# Patient Record
Sex: MALE | Race: ASIAN | NOT HISPANIC OR LATINO | Employment: UNEMPLOYED | URBAN - METROPOLITAN AREA
[De-identification: names, ages, dates, MRNs, and addresses within clinical notes are randomized per-mention and may not be internally consistent; named-entity substitution may affect disease eponyms.]

---

## 2017-09-11 ENCOUNTER — OFFICE VISIT - HEALTHEAST (OUTPATIENT)
Dept: FAMILY MEDICINE | Facility: CLINIC | Age: 27
End: 2017-09-11

## 2017-09-11 DIAGNOSIS — R42 VERTIGO: ICD-10-CM

## 2017-09-11 ASSESSMENT — MIFFLIN-ST. JEOR: SCORE: 2275.09

## 2020-01-24 ENCOUNTER — OFFICE VISIT - HEALTHEAST (OUTPATIENT)
Dept: FAMILY MEDICINE | Facility: CLINIC | Age: 30
End: 2020-01-24

## 2020-01-24 DIAGNOSIS — E66.01 CLASS 3 SEVERE OBESITY DUE TO EXCESS CALORIES WITH BODY MASS INDEX (BMI) OF 40.0 TO 44.9 IN ADULT, UNSPECIFIED WHETHER SERIOUS COMORBIDITY PRESENT (H): ICD-10-CM

## 2020-01-24 DIAGNOSIS — Z71.89 ADVANCED CARE PLANNING/COUNSELING DISCUSSION: ICD-10-CM

## 2020-01-24 DIAGNOSIS — Z11.3 SCREEN FOR STD (SEXUALLY TRANSMITTED DISEASE): ICD-10-CM

## 2020-01-24 DIAGNOSIS — N48.1 BALANITIS: ICD-10-CM

## 2020-01-24 DIAGNOSIS — E66.01 MORBID OBESITY (H): ICD-10-CM

## 2020-01-24 DIAGNOSIS — Z00.00 ROUTINE GENERAL MEDICAL EXAMINATION AT A HEALTH CARE FACILITY: ICD-10-CM

## 2020-01-24 DIAGNOSIS — R76.8 ABNORMAL HEPATITIS SEROLOGY: ICD-10-CM

## 2020-01-24 DIAGNOSIS — E66.813 CLASS 3 SEVERE OBESITY DUE TO EXCESS CALORIES WITH BODY MASS INDEX (BMI) OF 40.0 TO 44.9 IN ADULT, UNSPECIFIED WHETHER SERIOUS COMORBIDITY PRESENT (H): ICD-10-CM

## 2020-01-24 LAB
ALBUMIN SERPL-MCNC: 4 G/DL (ref 3.5–5)
ALBUMIN UR-MCNC: NEGATIVE MG/DL
ALP SERPL-CCNC: 113 U/L (ref 45–120)
ALT SERPL W P-5'-P-CCNC: 83 U/L (ref 0–45)
ANION GAP SERPL CALCULATED.3IONS-SCNC: 11 MMOL/L (ref 5–18)
APPEARANCE UR: CLEAR
AST SERPL W P-5'-P-CCNC: 35 U/L (ref 0–40)
BILIRUB SERPL-MCNC: 0.8 MG/DL (ref 0–1)
BILIRUB UR QL STRIP: NEGATIVE
BUN SERPL-MCNC: 14 MG/DL (ref 8–22)
CALCIUM SERPL-MCNC: 9.3 MG/DL (ref 8.5–10.5)
CHLORIDE BLD-SCNC: 106 MMOL/L (ref 98–107)
CO2 SERPL-SCNC: 25 MMOL/L (ref 22–31)
COLOR UR AUTO: YELLOW
CREAT SERPL-MCNC: 0.94 MG/DL (ref 0.7–1.3)
GFR SERPL CREATININE-BSD FRML MDRD: >60 ML/MIN/1.73M2
GLUCOSE BLD-MCNC: 136 MG/DL (ref 70–125)
GLUCOSE UR STRIP-MCNC: NEGATIVE MG/DL
HGB UR QL STRIP: NEGATIVE
HIV 1+2 AB+HIV1 P24 AG SERPL QL IA: NEGATIVE
KETONES UR STRIP-MCNC: NEGATIVE MG/DL
LEUKOCYTE ESTERASE UR QL STRIP: NEGATIVE
NITRATE UR QL: NEGATIVE
PH UR STRIP: 5.5 [PH] (ref 5–8)
POTASSIUM BLD-SCNC: 3.9 MMOL/L (ref 3.5–5)
PROT SERPL-MCNC: 6.9 G/DL (ref 6–8)
SODIUM SERPL-SCNC: 142 MMOL/L (ref 136–145)
SP GR UR STRIP: 1.02 (ref 1–1.03)
TSH SERPL DL<=0.005 MIU/L-ACNC: 1.77 UIU/ML (ref 0.3–5)
UROBILINOGEN UR STRIP-ACNC: NORMAL

## 2020-01-24 ASSESSMENT — MIFFLIN-ST. JEOR: SCORE: 2247.58

## 2020-01-25 LAB
HBV SURFACE AG SERPL QL IA: NEGATIVE
T PALLIDUM AB SER QL: NEGATIVE

## 2020-01-27 ENCOUNTER — COMMUNICATION - HEALTHEAST (OUTPATIENT)
Dept: FAMILY MEDICINE | Facility: CLINIC | Age: 30
End: 2020-01-27

## 2020-01-27 LAB
C TRACH DNA SPEC QL PROBE+SIG AMP: NEGATIVE
HEPATITIS B SURFACE ANTIBODY LHE- HISTORICAL: NEGATIVE
N GONORRHOEA DNA SPEC QL NAA+PROBE: NEGATIVE

## 2020-02-03 ENCOUNTER — COMMUNICATION - HEALTHEAST (OUTPATIENT)
Dept: FAMILY MEDICINE | Facility: CLINIC | Age: 30
End: 2020-02-03

## 2020-10-26 ENCOUNTER — COMMUNICATION - HEALTHEAST (OUTPATIENT)
Dept: SCHEDULING | Facility: CLINIC | Age: 30
End: 2020-10-26

## 2020-10-30 ENCOUNTER — COMMUNICATION - HEALTHEAST (OUTPATIENT)
Dept: FAMILY MEDICINE | Facility: CLINIC | Age: 30
End: 2020-10-30

## 2020-10-30 ENCOUNTER — OFFICE VISIT - HEALTHEAST (OUTPATIENT)
Dept: FAMILY MEDICINE | Facility: CLINIC | Age: 30
End: 2020-10-30

## 2020-10-30 DIAGNOSIS — R12 HEARTBURN: ICD-10-CM

## 2020-10-30 DIAGNOSIS — E66.01 MORBID OBESITY (H): ICD-10-CM

## 2020-10-30 DIAGNOSIS — K21.9 GASTROESOPHAGEAL REFLUX DISEASE WITHOUT ESOPHAGITIS: ICD-10-CM

## 2020-11-03 ENCOUNTER — AMBULATORY - HEALTHEAST (OUTPATIENT)
Dept: LAB | Facility: CLINIC | Age: 30
End: 2020-11-03

## 2020-11-03 DIAGNOSIS — K21.9 GASTROESOPHAGEAL REFLUX DISEASE WITHOUT ESOPHAGITIS: ICD-10-CM

## 2020-11-03 DIAGNOSIS — R12 HEARTBURN: ICD-10-CM

## 2020-11-04 LAB — H PYLORI AG STL QL IA: POSITIVE

## 2020-11-08 ENCOUNTER — AMBULATORY - HEALTHEAST (OUTPATIENT)
Dept: FAMILY MEDICINE | Facility: CLINIC | Age: 30
End: 2020-11-08

## 2020-11-08 DIAGNOSIS — A04.8 H. PYLORI INFECTION: ICD-10-CM

## 2020-12-08 ENCOUNTER — COMMUNICATION - HEALTHEAST (OUTPATIENT)
Dept: FAMILY MEDICINE | Facility: CLINIC | Age: 30
End: 2020-12-08

## 2021-04-19 ENCOUNTER — COMMUNICATION - HEALTHEAST (OUTPATIENT)
Dept: FAMILY MEDICINE | Facility: CLINIC | Age: 31
End: 2021-04-19

## 2021-04-22 ENCOUNTER — RECORDS - HEALTHEAST (OUTPATIENT)
Dept: GENERAL RADIOLOGY | Facility: CLINIC | Age: 31
End: 2021-04-22

## 2021-04-22 ENCOUNTER — OFFICE VISIT - HEALTHEAST (OUTPATIENT)
Dept: FAMILY MEDICINE | Facility: CLINIC | Age: 31
End: 2021-04-22

## 2021-04-22 DIAGNOSIS — M54.50 CHRONIC BILATERAL LOW BACK PAIN WITHOUT SCIATICA: ICD-10-CM

## 2021-04-22 DIAGNOSIS — G89.29 OTHER CHRONIC PAIN: ICD-10-CM

## 2021-04-22 DIAGNOSIS — M54.50 ACUTE MIDLINE LOW BACK PAIN WITHOUT SCIATICA: ICD-10-CM

## 2021-04-22 DIAGNOSIS — G89.29 CHRONIC BILATERAL LOW BACK PAIN WITHOUT SCIATICA: ICD-10-CM

## 2021-04-22 DIAGNOSIS — M54.50 LOW BACK PAIN: ICD-10-CM

## 2021-04-22 RX ORDER — CYCLOBENZAPRINE HCL 10 MG
10 TABLET ORAL 2 TIMES DAILY PRN
Qty: 20 TABLET | Refills: 0 | Status: SHIPPED | OUTPATIENT
Start: 2021-04-22 | End: 2022-06-06

## 2021-04-23 ENCOUNTER — AMBULATORY - HEALTHEAST (OUTPATIENT)
Dept: NURSING | Facility: CLINIC | Age: 31
End: 2021-04-23

## 2021-04-25 ENCOUNTER — COMMUNICATION - HEALTHEAST (OUTPATIENT)
Dept: FAMILY MEDICINE | Facility: CLINIC | Age: 31
End: 2021-04-25

## 2021-04-25 DIAGNOSIS — N48.89 FORESKIN FISSURE: ICD-10-CM

## 2021-04-25 DIAGNOSIS — M54.50 ACUTE MIDLINE LOW BACK PAIN WITHOUT SCIATICA: ICD-10-CM

## 2021-05-12 ENCOUNTER — COMMUNICATION - HEALTHEAST (OUTPATIENT)
Dept: FAMILY MEDICINE | Facility: CLINIC | Age: 31
End: 2021-05-12

## 2021-05-12 DIAGNOSIS — G89.29 CHRONIC BILATERAL LOW BACK PAIN WITHOUT SCIATICA: ICD-10-CM

## 2021-05-12 DIAGNOSIS — M54.50 ACUTE MIDLINE LOW BACK PAIN WITHOUT SCIATICA: ICD-10-CM

## 2021-05-12 DIAGNOSIS — M54.50 CHRONIC BILATERAL LOW BACK PAIN WITHOUT SCIATICA: ICD-10-CM

## 2021-05-14 ENCOUNTER — COMMUNICATION - HEALTHEAST (OUTPATIENT)
Dept: PHYSICAL MEDICINE AND REHAB | Facility: CLINIC | Age: 31
End: 2021-05-14

## 2021-05-14 ENCOUNTER — AMBULATORY - HEALTHEAST (OUTPATIENT)
Dept: NURSING | Facility: CLINIC | Age: 31
End: 2021-05-14

## 2021-05-27 RX ORDER — CLOTRIMAZOLE 1 %
CREAM (GRAM) TOPICAL
Qty: 30 G | Refills: 0 | Status: SHIPPED | OUTPATIENT
Start: 2021-05-27 | End: 2022-06-06

## 2021-05-31 VITALS — BODY MASS INDEX: 40.46 KG/M2 | HEIGHT: 71 IN | WEIGHT: 289 LBS

## 2021-06-04 VITALS
TEMPERATURE: 98 F | OXYGEN SATURATION: 97 % | HEIGHT: 70 IN | RESPIRATION RATE: 20 BRPM | WEIGHT: 285.56 LBS | HEART RATE: 78 BPM | BODY MASS INDEX: 40.88 KG/M2 | DIASTOLIC BLOOD PRESSURE: 80 MMHG | SYSTOLIC BLOOD PRESSURE: 118 MMHG

## 2021-06-04 VITALS
RESPIRATION RATE: 20 BRPM | DIASTOLIC BLOOD PRESSURE: 86 MMHG | WEIGHT: 284 LBS | HEART RATE: 73 BPM | SYSTOLIC BLOOD PRESSURE: 123 MMHG | BODY MASS INDEX: 41.04 KG/M2

## 2021-06-05 VITALS
RESPIRATION RATE: 14 BRPM | WEIGHT: 282.04 LBS | TEMPERATURE: 97.4 F | HEART RATE: 86 BPM | DIASTOLIC BLOOD PRESSURE: 87 MMHG | BODY MASS INDEX: 40.76 KG/M2 | SYSTOLIC BLOOD PRESSURE: 137 MMHG

## 2021-06-05 NOTE — TELEPHONE ENCOUNTER
Patient Returning Call  Reason for call:  Calling to check status of lab Request  Information relayed to patient:  Relayed Lab Note and Patient agrees. No further questions at this time.   Patient has additional questions:  No  If YES, what are your questions/concerns:  NA   Okay to leave a detailed message?: No call back needed

## 2021-06-05 NOTE — PROGRESS NOTES
Assessment:      Healthy male exam.    1. Routine general medical examination at a health care facility     2. Class 3 severe obesity due to excess calories with body mass index (BMI) of 40.0 to 44.9 in adult, unspecified whether serious comorbidity present (H)     3. Morbid obesity (H)  Thyroid Stimulating Hormone (TSH)   4. Advanced care planning/counseling discussion     5. Abnormal hepatitis serology  Comprehensive Metabolic Panel    Hepatitis B Surface Antibody (Anti-HBs)    Hepatitis B Surface Antigen (HBsAG)   6. Screen for STD (sexually transmitted disease)  Urinalysis    Chlamydia trachomatis & Neisseria gonorrhoeae, Amplified Detection    HIV Antigen/Antibody Screening [place]    Syphilis Screen, [facility]   7. Balanitis  nystatin (MYCOSTATIN) cream     Plan:      This is a 28 yo male here for physical exam:  1.  Health Maintenance -   2.  Obesity - BMI > 40; discussed, check TSH  The following high BMI interventions were performed this visit: encouragement to exercise and lifestyle education regarding diet   3.  Advanced care planning/ discussed today   4.  Abnormal hepatitis serology - will check  Labs today  5.  Screen for STD - check labs  6.  Balanitis - has white discharge at crease of glans - add Nystatin cream        Subjective:      Dk Harris is a 29 y.o. male who presents for an annual exam. The patient reports that there is not domestic violence in his life.     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    No exam data present     Current Outpatient Medications   Medication Sig Dispense Refill     nystatin (MYCOSTATIN) cream Apply topically sparingly two times a day to affected area only until symptoms improve 45 g 1     No current facility-administered medications for this visit.      History reviewed. No pertinent past medical history.  History reviewed. No pertinent surgical history.  Patient has no known allergies.  Family History   Problem Relation Age of Onset     Hypertension Mother      No Medical Problems Father      Social History     Socioeconomic History     Marital status: Single     Spouse name: Not on file     Number of children: Not on file     Years of education: Not on file     Highest education level: Not on file   Occupational History     Not on file   Social Needs     Financial resource strain: Not on file     Food insecurity:     Worry: Not on file     Inability: Not on file     Transportation needs:     Medical: Not on file     Non-medical: Not on file   Tobacco Use     Smoking status: Former Smoker     Last attempt to quit: 2017     Years since quittin.7     Smokeless tobacco: Never Used   Substance and Sexual Activity     Alcohol use: Not on file     Drug use: Not on file     Sexual activity: Not on file   Lifestyle     Physical activity:     Days per week: Not on file     Minutes per session: Not on file     Stress: Not on file   Relationships     Social connections:     Talks on phone: Not on file     Gets together:  "Not on file     Attends [employer] service: Not on file     Active member of club or organization: Not on file     Attends meetings of clubs or organizations: Not on file     Relationship status: Not on file     Intimate partner violence:     Fear of current or ex partner: Not on file     Emotionally abused: Not on file     Physically abused: Not on file     Forced sexual activity: Not on file   Other Topics Concern     Not on file   Social History Narrative     Not on file       Review of Systems  Review of Systems      Pertinent positives as noted in HPI; otherwise 12 point ROS negative.      Objective:     Vitals:    01/24/20 0854   BP: 118/80   Pulse: 78   Resp: 20   Temp: 98  F (36.7  C)   TempSrc: Oral   SpO2: 97%   Weight: (!) 285 lb 9 oz (129.5 kg)   Height: 5' 9.75\" (1.772 m)     Body mass index is 41.27 kg/m .    Physical  Physical Exam    EXAM:  /80 (Patient Site: Left Arm, Patient Position: Sitting, Cuff Size: Adult Large)   Pulse 78   Temp 98  F (36.7  C) (Oral)   Resp 20   Ht 5' 9.75\" (1.772 m)   Wt (!) 285 lb 9 oz (129.5 kg)   SpO2 97%   BMI 41.27 kg/m     Gen:  NAD, appears well, well-hydrated  HEENT:  TMs nl, oropharynx benign, nasal mucosa nl, conjunctiva clear  Neck:  Supple, no adenopathy, no thyromegaly, no carotid bruits, no JVD  Lungs:  Clear to auscultation bilaterally  Cor:  RRR no murmur  Abd:  Soft, nontender, BS+, no masses, no guarding or rebound, no HSM  :  Nl male genitalia except white debris in creases of foreskin, no hernias  Extr:  Neg.  Neuro:  No asymmetry, Nl motor tone/strength, nl sensation, reflexes =, gait nl, nl coordination, CN intact,   Skin:  Warm/dry            "

## 2021-06-09 ENCOUNTER — COMMUNICATION - HEALTHEAST (OUTPATIENT)
Dept: FAMILY MEDICINE | Facility: CLINIC | Age: 31
End: 2021-06-09

## 2021-06-09 DIAGNOSIS — M54.50 CHRONIC BILATERAL LOW BACK PAIN WITHOUT SCIATICA: ICD-10-CM

## 2021-06-09 DIAGNOSIS — M54.50 ACUTE MIDLINE LOW BACK PAIN WITHOUT SCIATICA: ICD-10-CM

## 2021-06-09 DIAGNOSIS — G89.29 CHRONIC BILATERAL LOW BACK PAIN WITHOUT SCIATICA: ICD-10-CM

## 2021-06-10 RX ORDER — NAPROXEN 500 MG/1
TABLET ORAL
Qty: 45 TABLET | Refills: 0 | Status: SHIPPED | OUTPATIENT
Start: 2021-06-10 | End: 2022-06-06

## 2021-06-12 NOTE — TELEPHONE ENCOUNTER
RN Triage:  No PCP    Spoke with 30 yr old Dk who c/o:    Burning sensation in upper stomach which radiates after a while to the chest.  Pain comes and goes.  Can be unbearable, especially the past 2 weeks.    Symptoms first began 3-4 months ago.    Pain can happen day or night.    Rates pain, when present, an 8-9 on a 0-10 pain scale.      Also, about one hour after eating, sometimes burps with acid reflux.    Tries drinking water, or eating a banana.  This seems to help decrease the pain.    Denies current pain.     One month ago had one episode of  dark, tarry, stool.    Previous episode about 3-4 months ago.    Hasn't had BM for 2 days.    PLAN:  Advised OV within the next 2 weeks per protocol.  Transferred pt to [facility] to schedule OV.  Pt scheduled for 10/30/20 at 11:40 with Ermelinda GRANT  Advised to avoid NSAIDS and ASA.  Advised to stop smoking.  Advised to eat smaller meals and avoid snack for 2 hours before sleeping.  Advised to avoid fatty/greasy food, spicy food, caffeine, mint and chocolate.  Advised to call back if abdominal pain is constant for more than 2 hours or become worse.    Irina Jackman RN   [facility] RN Triage          Reason for Disposition    Intermittent burning pains radiating into chest or sour taste in mouth    Additional Information    Negative: Passed out (i.e., fainted, collapsed and was not responding)    Negative: Shock suspected (e.g., cold/pale/clammy skin, too weak to stand, low BP, rapid pulse)    Negative: Visible sweat on face or sweat is dripping down    Negative: Chest pain    Negative: SEVERE abdominal pain (e.g., excruciating)    Negative: Pain lasting > 10 minutes and over 50 years old    Negative: Pain lasting > 10 minutes and over 40 years old and associated chest, arm, neck, upper back, or jaw pain    Negative: Pain lasting > 10 minutes and over 35 years old and at least one cardiac risk factor    Negative: Pain lasting > 10 minutes and history of heart  disease (i.e., heart attack, bypass surgery, angina, angioplasty, CHF)    Negative: Recent injury to the abdomen    Negative: Vomiting red blood or black (coffee ground) material    Negative: Bloody, black, or tarry bowel movements     Not currently.  2 episodes 1-4 months ago.    Negative: Pregnant > 24 weeks and hand or face swelling    Negative: Constant abdominal pain lasting > 2 hours    Negative: Vomiting bile (green color)    Negative: Patient sounds very sick or weak to the triager    Negative: Vomiting and abdomen looks much more swollen than usual    Negative: White of the eyes have turned yellow (i.e.,  jaundice)    Negative: Fever > 103 F (39.4 C)    Negative: Fever > 101 F (38.3 C) and over 60 years of age    Negative: Fever > 100.0 F (37.8 C) and has diabetes mellitus or a weak immune system (e.g., HIV positive, cancer chemotherapy, organ transplant, splenectomy, chronic steroids)    Negative: Fever > 100.0 F (37.8 C) and bedridden (e.g., nursing home patient, stroke, chronic illness, recovering from surgery)    Negative: Age > 60 years    Negative: Patient wants to be seen    Negative: MILD pain that comes and goes (cramps) lasts > 24 hours    Negative: Alcohol abuse known or suspected    Negative: Abdominal pain is a chronic symptom (recurrent or ongoing AND lasting > 4 weeks)    Protocols used: ABDOMINAL PAIN - UPPER-A-OH

## 2021-06-12 NOTE — TELEPHONE ENCOUNTER
Who is calling:  Patient  Reason for Call:    Patient requesting a Covering Provider review if appropriate.  Patient is questioning if he needs a prescription for his acid reflux.  Please reach out to patient and advise.  Thank you.  Date of last appointment with primary care: 10/30/2020  Okay to leave a detailed message: Yes

## 2021-06-12 NOTE — TELEPHONE ENCOUNTER
Question following Office Visit  When did you see your provider: today  What is your question: Patient asking if the doctor is going to prescribe a medication for his acid reflux? Please advise and call patient.  Okay to leave a detailed message: Yes

## 2021-06-12 NOTE — TELEPHONE ENCOUNTER
Patient calling back to ask again which antacids had been recommended.  RN advised  Maalox or Mylanta, per guidelines.    Celina Cyr RN  [facility]

## 2021-06-12 NOTE — PROGRESS NOTES
Subjective:    Dk Harris is a 30 y.o. male who presents with chief complaint of abdominal pain and heartburn.    For the past 6 months has been experiencing upper abdominal pain and heartburn. In past 2 weeks, burning sensation from upper abdominal area to chest has increased. Has never experienced heartburn but assumes it is this. Burps and feels acid come back up. Pain with palpation to upper abdomen when this is occuring. On and off with random occurrence but notices mostly 1 hour after eating. Not as bad in the morning. Has been taking Mylanta two times a day after meals which helps subside pain but comes back later in the day as a lesser pain. Researched his symptoms and worried that he may have stomach ulcers.     No major changes to diet. Triage nurse told him to avoid oily and spicy foods but he has had a hard time staying away from these foods. Non-smoker. Hasn't drank alcohol for a couple months. Hasn't noticed a weight gain or weight loss. Denies fevers. Denies vomiting. No difficulties swallowing. Denies chest pain or shortness of breath. Feeling constipated. Typically stools every day, but now going every other day. Stool is not hard. No bright red blood in the stool. Had a dark, tarry stool 4 months ago and again 2 months ago. Most recent episode lasted a week. None since then and stools are brown. No family history of GI issues.     Patient Active Problem List   Diagnosis     Obesity     Phimosis     Morbid obesity (H)     Advanced care planning/counseling discussion       Current Outpatient Medications:      cyclobenzaprine (FLEXERIL) 10 MG tablet, Take 1 tablet (10 mg total) by mouth 2 (two) times a day as needed for muscle spasms., Disp: 20 tablet, Rfl: 0     naproxen (NAPROSYN) 500 MG tablet, Take 1 tablet (500 mg total) by mouth 2 (two) times a day with meals., Disp: 30 tablet, Rfl: 0     nystatin (MYCOSTATIN) cream, Apply topically sparingly two times a day to affected area only until  symptoms improve, Disp: 45 g, Rfl: 1     Objective:   Allergies:  Patient has no known allergies.    Vitals:  Vitals:    10/30/20 1126   BP: 123/86   Patient Site: Right Arm   Patient Position: Sitting   Cuff Size: Adult Large   Pulse: 73   Resp: 20   Weight: (!) 284 lb (128.8 kg)     Body mass index is 41.04 kg/m .    Vital signs reviewed.  General: Patient is alert and oriented x 3, in no apparent distress  Cardiac: regular rate and rhythm, no murmurs  Pulmonary: lungs clear to auscultation bilaterally, no crackles, rales, rhonchi, or wheezing noted  GI: Abdomen soft, no masses. No pain with palpation. Bowel sounds slightly hypoactive. No organomegaly.     Results for orders placed or performed in visit on 01/24/20   Chlamydia trachomatis & Neisseria gonorrhoeae, Amplified Detection    Specimen: Body Fluid   Result Value Ref Range    Chlamydia trachomatis, Amplified Detection Negative Negative    Neisseria gonorrhoeae, Amplified Detection Negative Negative   Urinalysis   Result Value Ref Range    Color, UA Yellow Colorless, Yellow, Straw, Light Yellow    Clarity, UA Clear Clear    Glucose, UA Negative Negative    Bilirubin, UA Negative Negative    Ketones, UA Negative Negative    Specific Gravity, UA 1.025 1.005 - 1.030    Blood, UA Negative Negative    pH, UA 5.5 5.0 - 8.0    Protein, UA Negative Negative mg/dL    Urobilinogen, UA 1.0 E.U./dL 0.2 E.U./dL, 1.0 E.U./dL    Nitrite, UA Negative Negative    Leukocytes, UA Negative Negative   HIV Antigen/Antibody Screening [facility]   Result Value Ref Range    HIV Antigen / Antibody Negative Negative   Syphilis Screen, [facility]   Result Value Ref Range    Treponema Antibody (Syphilis) Negative Negative   Comprehensive Metabolic Panel   Result Value Ref Range    Sodium 142 136 - 145 mmol/L    Potassium 3.9 3.5 - 5.0 mmol/L    Chloride 106 98 - 107 mmol/L    CO2 25 22 - 31 mmol/L    Anion Gap, Calculation 11 5 - 18 mmol/L    Glucose 136 (H) 70 - 125 mg/dL    BUN 14 8 - 22  mg/dL    Creatinine 0.94 0.70 - 1.30 mg/dL    GFR MDRD Af Amer >60 >60 mL/min/1.73m2    GFR MDRD Non Af Amer >60 >60 mL/min/1.73m2    Bilirubin, Total 0.8 0.0 - 1.0 mg/dL    Calcium 9.3 8.5 - 10.5 mg/dL    Protein, Total 6.9 6.0 - 8.0 g/dL    Albumin 4.0 3.5 - 5.0 g/dL    Alkaline Phosphatase 113 45 - 120 U/L    AST 35 0 - 40 U/L    ALT 83 (H) 0 - 45 U/L   Hepatitis B Surface Antibody (Anti-HBs)   Result Value Ref Range    Hep B Surface Antibody Negative Negative   Hepatitis B Surface Antigen (HBsAG)   Result Value Ref Range    Hepatitis B Surface Ag Negative Negative   Thyroid Stimulating Hormone (TSH)   Result Value Ref Range    TSH 1.77 0.30 - 5.00 uIU/mL       Assessment and Plan:   Dk was seen today for heartburn and flu vaccine.    Diagnoses and all orders for this visit:    1. Gastroesophageal reflux disease without esophagitis  Heartburn and acid reflux symptoms consistent with GERD. Risk factors include obesity and eating spicy foods. Physical exam unremarkable. Start pepcid 20 mg two times a day. Can continue OTC dose of Mylanta. Test stool for H. Pylori. Reviewed GERD education including avoidance of spicy foods, avoidance of alcohol, and keeping HOB elevated, especially after eating. Follow up in 4 weeks to evaluate effectiveness. If no improvement, next steps would include lab workup to assess gallbladder and liver. Black, tarry stools seem to have resolved but would also consider upper endoscopy if melena resumes.  -famotidine (PEPCID) 20 MG tablet  -H. Pylori Antigen, Stool (HPSAG)    2. Heartburn  See above.  -famotidine (PEPCID) 20 MG tablet  -H. Pylori Antigen, Stool (HPSAG)    3. Morbid obesity (H)  BMI 41.04. Weight appears stable. Reviewed healthy diet and increasing physical activity. Limiting alcohol and smoking, stress reduction. Educated that weight loss may improve GERD symptoms as well.  Wt Readings from Last 3 Encounters:   10/30/20 (!) 284 lb (128.8 kg)   02/17/20 (!) 280 lb (127  kg)   01/24/20 (!) 285 lb 9 oz (129.5 kg)     4. Other orders  -Influenza vaccine      Sahra Daniels  DNP-FNP Student  [facility]    I was present for history, pertinent exam findings, and assessment/plan discussion and treatment plan.  ESCOBAR SimmonsC

## 2021-06-12 NOTE — PROGRESS NOTES
Chief Complaint:  Chief Complaint   Patient presents with     Dizziness     [place], he noticed lots of dizziness when he came back to [place]. He has been taking advil to release the dizziness but the medication didn't help.     HPI:   Dk Harris is a 27 y.o. male c/o dizziness.  He says he feels like he is dizzy and lightheaded.  This been going on for about 2-3 weeks.  It was initially quite bad, it has improved.  Today he feels like it might be almost gone.  He denies any presyncopal type symptoms.  He says occasionally he felt a little like that, but only if he stands up too quickly.  His main symptom is that he says the room spins.  He notes that he was in [place] when symptoms started.  He was swimming a lot in a creek and often got water in his ears.  He tried to see a provider in [place], but was unable to do so in a timely fashion.  He denies any falls or head injuries prior to symptoms starting.  He is taking Advil on occasion, but that has not been very helpful.  He has never had this happen before.  No past history of ear issues.  He says it was hot in [place], but he does not think he was dehydrated.  He was staying in an air conditioned location.  He just wants to make sure this is nothing serious.    Mother has hypertension, father is healthy.  No other significant family health issues.  He is otherwise healthy.  No current health issues, no current medications.    No current outpatient prescriptions on file.    Physical Exam:  Vitals:    09/11/17 1142   BP: 130/88   Pulse:    Resp:    Temp:      Body mass index is 40.88 kg/(m^2).    Vital signs stable as recorded above  General: Patient is alert and oriented x 3, in no apparent distress  Eyes: PERRL, EOMI bilaterally, no nystagmus  Ears: TMs non-erythematous with good light reflex bilaterally  Throat: no erythema, edema, or exudate noted  Lymphatic: No cervical lymph node enlargement  Cardiac: Regular rate and  rhythm; no murmurs  Pulmonary: Lung clear to auscultation bilaterally; no crackles, rales, rhonchi, or wheezing noted  Musculoskeletal: Normal 4/5 strength in major muscle groups in upper and lower extremities bilaterally, normal  strength  Neuro: Cranial Nerves 2 through 12 grossly intact, negative Romberg Test, patient walks in a normal tandem gait, negative Hallpike maneuver bilaterally    Assessment/Plan:  Vertigo.  Symptoms are improving.  It is possible this could be related to his ear issues when he was swimming late last month.  Exam is completely normal today.  No red flags on exam or history.  I provided him information on Epley's maneuvers and BPPV.  We discussed the possibility of meclizine, but since his symptoms are improving he declines this.  He will notify us if symptoms stop getting better, or if they worsen.  At that time consider treatment versus further follow-up in clinic or with specialist.    This dictation uses voice recognition software, which may contain typographical errors.

## 2021-06-16 PROBLEM — Z71.89 ADVANCED CARE PLANNING/COUNSELING DISCUSSION: Status: ACTIVE | Noted: 2020-01-24

## 2021-06-16 PROBLEM — E66.01 MORBID OBESITY (H): Status: ACTIVE | Noted: 2020-01-24

## 2021-06-16 PROBLEM — A04.8 H. PYLORI INFECTION: Status: ACTIVE | Noted: 2020-11-08

## 2021-06-16 NOTE — PROGRESS NOTES
Subjective:      Dk Harris is a 30 y.o. male with chief complaint of low back pain.  He has had low back pain for 4 weeks.  Staying the same, not improving.  He does not recall any falls or injuries.  He says he just woke up 1 day with the pain.  Pain is in the low back kind of in the middle.  Does not radiate into the legs.  No loss of control of bladder or bowel function.  No significant pain with walking.  He does have pain with bending at the waist.  Less pain with rest.  He does note that sometimes if he is sitting and turns in a certain way he gets a sharp shooting pain in his back.  Pain can come and go, depending on what he is doing.  In the past he has had occasional mild low back pain, but this is definitely worse than he has ever had before.  He notes that twisting, bending, and getting up from a seated position seem to make things worse.  He has been doing some back stretches and they have not been helpful.  He has not tried any oral medications yet.    Patient Active Problem List   Diagnosis     Obesity     Phimosis     Morbid obesity (H)     Advanced care planning/counseling discussion     H. pylori infection       Current Outpatient Medications:      cyclobenzaprine (FLEXERIL) 10 MG tablet, Take 1 tablet (10 mg total) by mouth 2 (two) times a day as needed for muscle spasms., Disp: 20 tablet, Rfl: 0     naproxen (NAPROSYN) 500 MG tablet, Take 1 tablet (500 mg total) by mouth 2 (two) times a day with meals., Disp: 45 tablet, Rfl: 0      Tobacco Use      Smoking status: Former Smoker        Quit date: 5/1/2017        Years since quitting: 3.9      Smokeless tobacco: Never Used       Objective:     No Known Allergies  Vitals:    04/22/21 1304   BP: 137/87   Pulse: 86   Resp: 14   Temp: 97.4  F (36.3  C)     Body mass index is 40.76 kg/m .    Vitals reviewed as above.  General: Patient is alert and oriented x 3, in no apparent distress  Cardiac: Regular rate and rhythm, no murmurs  Pulmonary: lungs  clear to ausculation, no crackles, rales, rhonchi, or wheezing  Musculoskeletal: normal 4/5 strength in major muscle groups in upper and lower extremities bilaterally, normal foot strength bilaterally, negative straight leg raise test bilaterally, he can flex to 90 degrees at the waist without pain, points to lumbar sacral paraspinous muscles and right buttocks area is main areas of pain, walks a normal tandem gait    Xr Lumbar Spine 2 Or 3 Vws Result Date: 4/23/2021  EXAM: XR LUMBAR SPINE 2 OR 3 VWS LOCATION: [facility] DATE/TIME: 4/22/2021 1:48 PM INDICATION: Low back pain COMPARISON: None. TECHNIQUE: CR Lumbar Spine.   5 lumbar type vertebrae. Subtle levocurvature of the lumbar spine. Normal sagittal alignment. Vertebral body heights are maintained. No pars defects. Mild intervertebral disc height loss and mild facet arthropathy at L5-S1. The visualized bony pelvis is grossly within normal limits.    I personally reviewed grossly normal x-ray today.  No fractures or other acute/significant abnormalities noted.    Assessment and Plan:     1. Acute midline low back pain without sciatica  Likely musculoskeletal in nature.  No red flags on exam or history.  X-ray reassuring.  He does have morbid obesity, which could be contributing to his symptoms.  Discussed continued symptomatic treatment as below.  Prescription sent.  I reviewed appropriate use and possible side effects.  Also referral made to physical therapy.  If he is not improving, could consider referral to spine care.  - XR Lumbar Spine 2 or 3 VWS; Future  - naproxen (NAPROSYN) 500 MG tablet; Take 1 tablet (500 mg total) by mouth 2 (two) times a day with meals.  Dispense: 45 tablet; Refill: 0  - cyclobenzaprine (FLEXERIL) 10 MG tablet; Take 1 tablet (10 mg total) by mouth 2 (two) times a day as needed for muscle spasms.  Dispense: 20 tablet; Refill: 0  - Ambulatory referral to PT/OT    This dictation uses voice recognition software,  which may contain typographical errors.

## 2021-06-17 NOTE — TELEPHONE ENCOUNTER
RN cannot approve Refill Request    RN can NOT refill this medication med is not covered by policy/route to provider. Last office visit: 4/22/2021 Ermelinda Jerome PA-C Last Physical: Visit date not found Last MTM visit: Visit date not found Last visit same specialty: 4/22/2021 Ermelinda Jerome PA-C.  Next visit within 3 mo: Visit date not found  Next physical within 3 mo: Visit date not found      Leila Johnson, [facility] Triage/Med Refill 5/12/2021    Requested Prescriptions   Pending Prescriptions Disp Refills     naproxen (NAPROSYN) 500 MG tablet [Pharmacy Med Name: NAPROXEN 500MG TABLETS] 45 tablet 0     Sig: TAKE 1 TABLET(500 MG) BY MOUTH TWICE DAILY WITH MEALS       There is no refill protocol information for this order

## 2021-06-20 NOTE — LETTER
Letter by Debby Chen MD at      Author: Debby Chen MD Service: -- Author Type: --    Filed:  Encounter Date: 1/27/2020 Status: (Other)         Dk Harris  [address]  [place] [address]             January 27, 2020         Dear Mr. Harris,    Below are the results from your recent visit:    Resulted Orders   Urinalysis   Result Value Ref Range    Color, UA Yellow Colorless, Yellow, Straw, Light Yellow    Clarity, UA Clear Clear    Glucose, UA Negative Negative    Bilirubin, UA Negative Negative    Ketones, UA Negative Negative    Specific Gravity, UA 1.025 1.005 - 1.030    Blood, UA Negative Negative    pH, UA 5.5 5.0 - 8.0    Protein, UA Negative Negative mg/dL    Urobilinogen, UA 1.0 E.U./dL 0.2 E.U./dL, 1.0 E.U./dL    Nitrite, UA Negative Negative    Leukocytes, UA Negative Negative    Narrative    Microscopic not indicated   Chlamydia trachomatis & Neisseria gonorrhoeae, Amplified Detection   Result Value Ref Range    Chlamydia trachomatis, Amplified Detection Negative Negative    Neisseria gonorrhoeae, Amplified Detection Negative Negative   HIV Antigen/Antibody Screening Cascade   Result Value Ref Range    HIV Antigen / Antibody Negative Negative    Narrative    Method is Abbott HIV Ag/Ab for the detection of HIV p24 antigen, HIV-1 antibodies and HIV-2 antibodies.   Syphilis Screen, Cascade   Result Value Ref Range    Treponema Antibody (Syphilis) Negative Negative   Comprehensive Metabolic Panel   Result Value Ref Range    Sodium 142 136 - 145 mmol/L    Potassium 3.9 3.5 - 5.0 mmol/L    Chloride 106 98 - 107 mmol/L    CO2 25 22 - 31 mmol/L    Anion Gap, Calculation 11 5 - 18 mmol/L    Glucose 136 (H) 70 - 125 mg/dL    BUN 14 8 - 22 mg/dL    Creatinine 0.94 0.70 - 1.30 mg/dL    GFR MDRD Af Amer >60 >60 mL/min/1.73m2    GFR MDRD Non Af Amer >60 >60 mL/min/1.73m2    Bilirubin, Total 0.8 0.0 - 1.0 mg/dL    Calcium 9.3 8.5 - 10.5 mg/dL    Protein, Total 6.9 6.0 - 8.0 g/dL     Albumin 4.0 3.5 - 5.0 g/dL    Alkaline Phosphatase 113 45 - 120 U/L    AST 35 0 - 40 U/L    ALT 83 (H) 0 - 45 U/L    Narrative    Fasting Glucose reference range is 70-99 mg/dL per  American Diabetes Association (ADA) guidelines.   Hepatitis B Surface Antibody (Anti-HBs)   Result Value Ref Range    Hep B Surface Antibody Negative Negative   Hepatitis B Surface Antigen (HBsAG)   Result Value Ref Range    Hepatitis B Surface Ag Negative Negative   Thyroid Stimulating Hormone (TSH)   Result Value Ref Range    TSH 1.77 0.30 - 5.00 uIU/mL       Your labs are all normal - except the blood sugar is just slightly high at 136.  This may indicate that you ate something before your visit.  We should recheck this at your next visit.     Please call with questions or contact us using [employer].    Sincerely,        Electronically signed by Debby Chen MD

## 2021-06-21 NOTE — LETTER
Letter by Laurence Antonio at      Author: Laurence Antonio Service: -- Author Type: --    Filed:  Encounter Date: 5/14/2021 Status: (Other)         Dk Harris  [address] [facility] [address]      May 14, 2021      Dear Mr. Harris,    At [facility], we care about your health and well-being. Recently, we received a  referral to get you scheduled at the [facility]. We have attempted to  assist you in scheduling this appointment and have been unsuccessful in reaching you.    Please call our Intake line at your earliest convenience for assistance in scheduling an  appointment. Thank you for choosing [facility].      Sincerely,        [facility] Intake team  [phone]

## 2021-06-25 ENCOUNTER — COMMUNICATION - HEALTHEAST (OUTPATIENT)
Dept: FAMILY MEDICINE | Facility: CLINIC | Age: 31
End: 2021-06-25

## 2021-06-25 DIAGNOSIS — N48.89 FORESKIN FISSURE: ICD-10-CM

## 2021-06-25 NOTE — TELEPHONE ENCOUNTER
RN cannot approve Refill Request    RN can NOT refill this medication med is not covered by policy/route to provider. Last office visit: 4/22/2021 Ermelinda Jerome PA-C Last Physical: Visit date not found Last MTM visit: Visit date not found Last visit same specialty: 4/22/2021 Ermelinda Jerome PA-C.  Next visit within 3 mo: Visit date not found  Next physical within 3 mo: Visit date not found      eLila Johnson, [facility] Triage/Med Refill 6/9/2021    Requested Prescriptions   Pending Prescriptions Disp Refills     naproxen (NAPROSYN) 500 MG tablet [Pharmacy Med Name: NAPROXEN 500MG TABLETS] 45 tablet 0     Sig: TAKE 1 TABLET(500 MG) BY MOUTH TWICE DAILY WITH MEALS       There is no refill protocol information for this order

## 2021-06-25 NOTE — TELEPHONE ENCOUNTER
Pharmacy did not receive electronic rx for lotrimin. Medication refill requested. Please authorize medication if appropriate.

## 2021-06-27 ENCOUNTER — HEALTH MAINTENANCE LETTER (OUTPATIENT)
Age: 31
End: 2021-06-27

## 2021-06-28 RX ORDER — CLOTRIMAZOLE 1 %
CREAM (GRAM) TOPICAL
Qty: 30 G | Refills: 0 | Status: SHIPPED | OUTPATIENT
Start: 2021-06-28 | End: 2022-06-06

## 2021-07-04 NOTE — ADDENDUM NOTE
Addendum Note by Abe Jerome PA-C at 5/6/2021  6:01 PM     Author: Abe Jerome PA-C Service: -- Author Type: Physician Assistant    Filed: 5/6/2021  6:01 PM Encounter Date: 4/25/2021 Status: Signed    : Abe Jerome PA-C (Physician Assistant)    Addended by: ABE JEROME on: 5/6/2021 06:01 PM        Modules accepted: Orders

## 2021-07-04 NOTE — ADDENDUM NOTE
Addendum Note by Andrew Barbosa MA at 5/26/2021  8:55 AM     Author: Andrew Barbosa MA Service: -- Author Type: Medical Assistant    Filed: 5/26/2021  8:55 AM Encounter Date: 4/25/2021 Status: Signed    : Andrew Barbosa MA (Medical Assistant)    Addended by: ANDREW BARBOSA on: 5/26/2021 08:55 AM        Modules accepted: Orders

## 2021-07-04 NOTE — ADDENDUM NOTE
Addendum Note by Abe Jerome PA-C at 5/18/2021  4:47 PM     Author: Abe Jerome PA-C Service: -- Author Type: Physician Assistant    Filed: 5/18/2021  4:47 PM Encounter Date: 4/25/2021 Status: Signed    : Abe Jerome PA-C (Physician Assistant)    Addended by: ABE JEROME on: 5/18/2021 04:47 PM        Modules accepted: Orders

## 2021-07-07 NOTE — TELEPHONE ENCOUNTER
RN cannot approve Refill Request    RN can NOT refill this medication No established PCP. Last office visit: 4/22/2021 Ermelinda Jerome PA-C Last Physical: Visit date not found Last MTM visit: Visit date not found Last visit same specialty: 4/22/2021 Ermelinda Jerome PA-C.  Next visit within 3 mo: Visit date not found  Next physical within 3 mo: Visit date not found      Alysha Ngo, [facility] Triage/Med Refill 6/25/2021    Requested Prescriptions   Pending Prescriptions Disp Refills     clotrimazole (LOTRIMIN) 1 % cream [Pharmacy Med Name: CLOTRIMAZOLE 1% CREAM 30GM] 30 g 0     Sig: APPLY TOPICALLY TO AFFECTED AREA TWICE DAILY FOR 2 TO 3 WEEKS, OK TO STOP SOONER IF SYMPTOMS GO AWAY       There is no refill protocol information for this order

## 2021-10-17 ENCOUNTER — HEALTH MAINTENANCE LETTER (OUTPATIENT)
Age: 31
End: 2021-10-17

## 2022-06-06 ENCOUNTER — NURSE TRIAGE (OUTPATIENT)
Dept: NURSING | Facility: CLINIC | Age: 32
End: 2022-06-06
Payer: COMMERCIAL

## 2022-06-06 ENCOUNTER — OFFICE VISIT (OUTPATIENT)
Dept: FAMILY MEDICINE | Facility: CLINIC | Age: 32
End: 2022-06-06
Payer: COMMERCIAL

## 2022-06-06 VITALS
SYSTOLIC BLOOD PRESSURE: 156 MMHG | RESPIRATION RATE: 22 BRPM | OXYGEN SATURATION: 98 % | DIASTOLIC BLOOD PRESSURE: 103 MMHG | TEMPERATURE: 98.1 F | HEART RATE: 80 BPM | WEIGHT: 275.2 LBS | BODY MASS INDEX: 39.77 KG/M2

## 2022-06-06 DIAGNOSIS — L30.9 DERMATITIS: Primary | ICD-10-CM

## 2022-06-06 PROCEDURE — 99213 OFFICE O/P EST LOW 20 MIN: CPT | Performed by: NURSE PRACTITIONER

## 2022-06-06 RX ORDER — CETIRIZINE HYDROCHLORIDE 10 MG/1
10 TABLET ORAL DAILY
Qty: 30 TABLET | Refills: 3 | Status: SHIPPED | OUTPATIENT
Start: 2022-06-06 | End: 2022-07-06

## 2022-06-06 RX ORDER — BENZOCAINE/MENTHOL 6 MG-10 MG
LOZENGE MUCOUS MEMBRANE 2 TIMES DAILY PRN
Qty: 60 G | Refills: 0 | Status: SHIPPED | OUTPATIENT
Start: 2022-06-06

## 2022-06-06 ASSESSMENT — ENCOUNTER SYMPTOMS: RHINORRHEA: 0

## 2022-06-06 NOTE — PATIENT INSTRUCTIONS
Room temp showers only until better - avoid hot     Cetirizine daily for itching and cream to itchy areas only if pill not working    Come back with pain in areas (?infection) or if this doesn't relieve problem in a week or two.      Ice packs for itching

## 2022-06-06 NOTE — TELEPHONE ENCOUNTER
Rash on forearms, and itching.    Rash looks puffy.left arm. Very itchy.  Patient advised to make an appointment.  Scratching arm , but tries not to scratch it.to not inflame,    Patient advised to make an appointment.    Caller sent to [facility] to make an appointment for today.   Surekha Colin RN   [facility] Nurse Advisor          Reason for Disposition    Patient wants to be seen    Additional Information    Negative: Sounds like a life-threatening emergency to the triager    Negative: Possible contact with poison ivy or oak    Negative: Insect bite(s) suspected    Negative: Athlete's Foot suspected (i.e., itchy rash between the toes)    Negative: Jock Itch suspected (i.e., itchy rash on inner thighs near genital area)    Negative: Wound infection suspected (i.e., pain, spreading redness, or pus; in a cut, puncture, scrape or sutured wound)    Negative: Rash of external female genital area (vulva)    Negative: Rash of penis or scrotum    Negative: Small spot, skin growth, or mole    Negative: Fever and localized purple or blood-colored spots or dots that are not from injury or friction    Negative: Fever and localized rash is very painful    Negative: Patient sounds very sick or weak to the triager    Negative: Looks like a boil, infected sore, deep ulcer, or other infected rash (spreading redness, pus)    Negative: Lyme disease suspected (e.g., bull's-eye rash or tick bite / exposure)    Negative: Localized purple or blood-colored spots or dots that are not from injury or friction (no fever)    Negative: Localized rash is very painful (no fever)    Negative: Painful rash with multiple small blisters grouped together (i.e., dermatomal distribution or 'band' or 'stripe')    Protocols used: RASH OR REDNESS - [id number]

## 2022-06-06 NOTE — PROGRESS NOTES
"Assessment & Plan     Dermatitis    - cetirizine (ZYRTEC) 10 MG tablet  Dispense: 30 tablet; Refill: 3  - hydrocortisone (CORTAID) 1 % external cream  Dispense: 60 g; Refill: 0     Patient with a history of similar rash seasonally associated with pruritus.  This is the worst year ever.  Try Zyrtec, topical hydrocortisone as needed if Zyrtec not effective.  Avoid hot showers.  Ice packs.    Recheck if not much better in 1 to 2 weeks.              Return in about 2 weeks (around 6/20/2022).    Melissa Montemayor, [facility] SIMONE Root is a 31 year old male who presents to clinic today for the following health issues:  Chief Complaint   Patient presents with     Derm Problem     Bumps on both arms x a couple of weeks. Itching both arms, \"make skin feel more rough\"     HPI    Rash on bilateral forearms x 2 weeks starting with \"nice days outside.\" Will get similar associated with sweating that is transient.    Started on hands and worsened.    +Pruritic.  No pain at the site.  Appeared somewhat swollen on the left forearm yesterday, better now.    Never has had this bad on both arms     No other areas    Yesterday tried ibuprofen           Review of Systems   HENT: Negative for congestion, rhinorrhea and sneezing.    Allergic/Immunologic: Negative for environmental allergies.           Objective    BP (!) 156/103 (BP Location: Right arm, Patient Position: Sitting, Cuff Size: Adult Regular)   Pulse 80   Temp 98.1  F (36.7  C) (Oral)   Resp 22   Wt 124.8 kg (275 lb 3.2 oz)   SpO2 98%   BMI 39.77 kg/m    Physical Exam  Constitutional:       Appearance: He is well-developed.   HENT:      Right Ear: External ear normal.      Left Ear: External ear normal.   Eyes:      General:         Right eye: No discharge.         Left eye: No discharge.      Conjunctiva/sclera: Conjunctivae normal.   Pulmonary:      Effort: Pulmonary effort is normal.   Musculoskeletal:         General: " Normal range of motion.   Skin:     General: Skin is warm.      Findings: Rash (fine papular rash on bilateral forearms left greater than right ) present.   Neurological:      Mental Status: He is alert and oriented to person, place, and time.   Psychiatric:         Mood and Affect: Mood normal.         Behavior: Behavior normal.         Thought Content: Thought content normal.         Judgment: Judgment normal.

## 2022-07-24 ENCOUNTER — HEALTH MAINTENANCE LETTER (OUTPATIENT)
Age: 32
End: 2022-07-24

## 2022-10-02 ENCOUNTER — HEALTH MAINTENANCE LETTER (OUTPATIENT)
Age: 32
End: 2022-10-02

## 2023-08-12 ENCOUNTER — HEALTH MAINTENANCE LETTER (OUTPATIENT)
Age: 33
End: 2023-08-12

## 2024-10-05 ENCOUNTER — HEALTH MAINTENANCE LETTER (OUTPATIENT)
Age: 34
End: 2024-10-05